# Patient Record
Sex: MALE | Race: WHITE | NOT HISPANIC OR LATINO | Employment: FULL TIME | URBAN - METROPOLITAN AREA
[De-identification: names, ages, dates, MRNs, and addresses within clinical notes are randomized per-mention and may not be internally consistent; named-entity substitution may affect disease eponyms.]

---

## 2017-05-22 ENCOUNTER — HOSPITAL ENCOUNTER (EMERGENCY)
Facility: HOSPITAL | Age: 27
Discharge: HOME | End: 2017-05-22
Attending: EMERGENCY MEDICINE
Payer: COMMERCIAL

## 2017-05-22 VITALS
BODY MASS INDEX: 25.06 KG/M2 | SYSTOLIC BLOOD PRESSURE: 157 MMHG | HEART RATE: 86 BPM | WEIGHT: 185 LBS | HEIGHT: 72 IN | RESPIRATION RATE: 20 BRPM | TEMPERATURE: 99 F | OXYGEN SATURATION: 97 % | DIASTOLIC BLOOD PRESSURE: 97 MMHG

## 2017-05-22 DIAGNOSIS — S61.219A LACERATION OF FINGER, LEFT, INITIAL ENCOUNTER: Primary | ICD-10-CM

## 2017-05-22 PROCEDURE — 12001 RPR S/N/AX/GEN/TRNK 2.5CM/<: CPT

## 2017-05-22 PROCEDURE — 99283 EMERGENCY DEPT VISIT LOW MDM: CPT | Mod: 25

## 2017-05-22 NOTE — ED PROVIDER NOTES
"Encounter Date: 5/22/2017       History     Chief Complaint   Patient presents with    Laceration     Pt states, "I cut my finger with a kitchen knife while cutting bread".     Review of patient's allergies indicates:  No Known Allergies  26 year old male with complaint of laceration to left ring finger X one hour.  Pt accidentally cut himself with kitchen knife while cooking.  Mild pain.  No numbness or tingling or weakness of finger.  Pt denies radiation of pain.  Reports tetanus shot up to date.            Past Medical History:   Diagnosis Date    ADHD (attention deficit hyperactivity disorder)     Anxiety     Bipolar disorder     Depression     Hep C w/o coma, chronic     Hepatitis C antibody test positive - with negative HCV RNA  4/12/2016    History of psychiatric hospitalization     [facility]? last Feb 2016 [facility] Xs 2 last Dec 2015    Liver disease     Hepatitis C    Psychiatric exam requested by authority     Psychiatric problem     Psychosis     Aud    Suicide attempt     attempted OD @ age 15 / 16    Therapy      Past Surgical History:   Procedure Laterality Date    arm surgery       Family History   Problem Relation Age of Onset    Bipolar disorder Mother      Social History   Substance Use Topics    Smoking status: Current Every Day Smoker     Packs/day: 1.00     Years: 9.00     Types: Cigarettes    Smokeless tobacco: Not on file    Alcohol use 0.0 oz/week      Comment: 1 pint a week     Review of Systems   Constitutional: Negative for fever.   HENT: Negative for sore throat.    Respiratory: Negative for shortness of breath.    Cardiovascular: Negative for chest pain.   Gastrointestinal: Negative for nausea.   Genitourinary: Negative for dysuria.   Musculoskeletal: Negative for back pain.   Skin: Negative for rash.        Laceration finger   Neurological: Negative for weakness.   Hematological: Does not bruise/bleed easily.       Physical Exam     Initial Vitals [05/22/17 " 0933]   BP Pulse Resp Temp SpO2   (!) 157/97 86 20 98.5 °F (36.9 °C) 97 %     Physical Exam    Nursing note and vitals reviewed.  Constitutional: He appears well-developed and well-nourished.   HENT:   Head: Normocephalic and atraumatic.   Eyes: Conjunctivae are normal. Pupils are equal, round, and reactive to light.   Neck: Normal range of motion. Neck supple.   Cardiovascular: Normal rate, regular rhythm, normal heart sounds and intact distal pulses.   Pulmonary/Chest: Breath sounds normal.   Abdominal: Soft. There is no rebound and no guarding.   Musculoskeletal: Normal range of motion.   Neurological: He is alert.   Skin: Skin is warm and dry.   1 cm superficial laceration distal volar aspect of left ring finger, no tendon lacerations, wound clean   Psychiatric: He has a normal mood and affect. His behavior is normal. Thought content normal.         ED Course   Lac Repair  Date/Time: 5/22/2017 9:51 AM  Performed by: ROBERTO BRANTLEY  Authorized by: CHAPITO COLE   Comments: Wound irrigated with NS, cleansed with betadine and approximated with tissue glue    Splint Application  Date/Time: 5/22/2017 9:51 AM  Performed by: ROBERTO BRANTLEY  Authorized by: CHAPITO COLE   Comments: Aluminum foam splint applied to left ring finger: alignment good, neurovascular status intact        Labs Reviewed - No data to display                            ED Course     Clinical Impression:   The encounter diagnosis was Laceration of finger, left, initial encounter.          Roberto Brantley NP  05/22/17 0952

## 2018-02-08 ENCOUNTER — OFFICE VISIT (OUTPATIENT)
Dept: INTERNAL MEDICINE | Facility: CLINIC | Age: 28
End: 2018-02-08
Payer: COMMERCIAL

## 2018-02-08 ENCOUNTER — LAB VISIT (OUTPATIENT)
Dept: LAB | Facility: HOSPITAL | Age: 28
End: 2018-02-08
Attending: INTERNAL MEDICINE
Payer: COMMERCIAL

## 2018-02-08 VITALS
WEIGHT: 193.56 LBS | OXYGEN SATURATION: 97 % | BODY MASS INDEX: 27.1 KG/M2 | SYSTOLIC BLOOD PRESSURE: 132 MMHG | DIASTOLIC BLOOD PRESSURE: 80 MMHG | HEART RATE: 72 BPM | HEIGHT: 71 IN

## 2018-02-08 DIAGNOSIS — Z00.00 PHYSICAL EXAM: Primary | ICD-10-CM

## 2018-02-08 DIAGNOSIS — Z00.00 PHYSICAL EXAM: ICD-10-CM

## 2018-02-08 DIAGNOSIS — F19.10 SUBSTANCE ABUSE: ICD-10-CM

## 2018-02-08 LAB
CHOLEST SERPL-MCNC: 166 MG/DL
CHOLEST/HDLC SERPL: 4 {RATIO}
HDLC SERPL-MCNC: 42 MG/DL
HDLC SERPL: 25.3 %
LDLC SERPL CALC-MCNC: 97.8 MG/DL
NONHDLC SERPL-MCNC: 124 MG/DL
TRIGL SERPL-MCNC: 131 MG/DL

## 2018-02-08 PROCEDURE — 3008F BODY MASS INDEX DOCD: CPT | Mod: S$GLB,,, | Performed by: INTERNAL MEDICINE

## 2018-02-08 PROCEDURE — 80074 ACUTE HEPATITIS PANEL: CPT

## 2018-02-08 PROCEDURE — 86580 TB INTRADERMAL TEST: CPT | Mod: S$GLB,,, | Performed by: INTERNAL MEDICINE

## 2018-02-08 PROCEDURE — 36415 COLL VENOUS BLD VENIPUNCTURE: CPT | Mod: PO

## 2018-02-08 PROCEDURE — 99395 PREV VISIT EST AGE 18-39: CPT | Mod: S$GLB,,, | Performed by: INTERNAL MEDICINE

## 2018-02-08 PROCEDURE — 86703 HIV-1/HIV-2 1 RESULT ANTBDY: CPT

## 2018-02-08 PROCEDURE — 80061 LIPID PANEL: CPT

## 2018-02-08 PROCEDURE — 99213 OFFICE O/P EST LOW 20 MIN: CPT | Mod: PO | Performed by: INTERNAL MEDICINE

## 2018-02-08 PROCEDURE — 87491 CHLMYD TRACH DNA AMP PROBE: CPT

## 2018-02-08 PROCEDURE — 99999 PR PBB SHADOW E&M-EST. PATIENT-LVL III: CPT | Mod: PBBFAC,,, | Performed by: INTERNAL MEDICINE

## 2018-02-08 PROCEDURE — 86592 SYPHILIS TEST NON-TREP QUAL: CPT

## 2018-02-08 NOTE — PROGRESS NOTES
Subjective:       Patient ID: Umberto Atkins is a 27 y.o. male.    Chief Complaint: Physical Exam, establish care    HPI Mr. Atkins is a 27-year-old male with ADHD, bipolar disorder, tobacco abuse, substance abuse and history of positive hepatitis C antibody who presents for a physical exam and to establish care.  He needs a documented physical exam, lab work, and PPD testing in order to enter a substance abuse program.  He presents today so that we can assist with this.  On review of systems he reports episodes of shortness of breath that have occurred when he feels anxious.  Otherwise he reported headache which occurred one week ago but has not occurred since that time.    Past medical history: ADHD, bipolar disorder, history of psychosis, history of suicide attempt, history of positive hepatitis C antibody  Past surgical history: Arm surgery, no surgery  Social history: Current tobacco use.  Also positive for current drug use.  Patient states that he uses cocaine and this is the reason he is trying to get himself into a treatment facility.  Also uses alcohol.  Family history: Mother with bipolar disorder  Medications: None  Allergies: None    Review of Systems   Constitutional: Negative for fever.   HENT: Negative for congestion.    Eyes: Negative for visual disturbance.   Respiratory: Positive for shortness of breath.    Cardiovascular: Negative for chest pain.   Gastrointestinal: Negative for vomiting.   Skin: Negative for rash.   Neurological: Positive for headaches (one week ago now resolved).       Objective:      Physical Exam   Constitutional: He is oriented to person, place, and time. He appears well-developed and well-nourished. No distress.   HENT:   Head: Normocephalic and atraumatic.   Right Ear: External ear normal.   Left Ear: External ear normal.   Nose: Nose normal.   Mouth/Throat: Oropharynx is clear and moist. No oropharyngeal exudate.   Eyes: Conjunctivae and EOM are normal. Pupils are equal,  round, and reactive to light. Right eye exhibits no discharge. Left eye exhibits no discharge. No scleral icterus.   Neck: Neck supple. No tracheal deviation present. No thyromegaly present.   Cardiovascular: Normal rate, regular rhythm, normal heart sounds and intact distal pulses.  Exam reveals no gallop and no friction rub.    No murmur heard.  Pulmonary/Chest: Effort normal and breath sounds normal. No respiratory distress. He has no wheezes. He has no rales.   Abdominal: Soft. Bowel sounds are normal. He exhibits no distension and no mass. There is no tenderness. There is no rebound and no guarding.   Musculoskeletal: He exhibits no edema or deformity.   Lymphadenopathy:     He has no cervical adenopathy.   Neurological: He is alert and oriented to person, place, and time.   Skin: Skin is warm and dry. He is not diaphoretic.   Psychiatric: Thought content normal.   Appeared anxious. Answered questions appropriately   Vitals reviewed.      Assessment:       1. Physical exam    2. Substance abuse        Plan:     #1 physical exam and required labs  Patient presents with a request for physical exam and required lab work in order to enter a substance abuse treatment facility.  Patient has a normal physical exam.  He is requesting lab work which includes lipid panel, TB skin test, STD screening, hepatitis panel, HIV, and RPR.  We are obtaining his labs today.  We are placing a TB skin test today.  He will return on Saturday for this to be read.  He is aware that he will need to provide us with the fax number for the substance abuse treatment facility in order to fax this note and the results of his testing.    RTC PRN

## 2018-02-09 LAB
C TRACH DNA SPEC QL NAA+PROBE: NOT DETECTED
HAV IGM SERPL QL IA: NEGATIVE
HBV CORE IGM SERPL QL IA: NEGATIVE
HBV SURFACE AG SERPL QL IA: NEGATIVE
HCV AB SERPL QL IA: POSITIVE
HIV 1+2 AB+HIV1 P24 AG SERPL QL IA: NEGATIVE
N GONORRHOEA DNA SPEC QL NAA+PROBE: NOT DETECTED
RPR SER QL: NORMAL

## 2018-02-09 RX ORDER — LITHIUM CARBONATE 300 MG/1
300 CAPSULE ORAL
COMMUNITY

## 2018-02-09 RX ORDER — VALACYCLOVIR HYDROCHLORIDE 1 G/1
1000 TABLET, FILM COATED ORAL
COMMUNITY

## 2018-02-12 ENCOUNTER — HOSPITAL ENCOUNTER (OUTPATIENT)
Dept: RADIOLOGY | Facility: HOSPITAL | Age: 28
Discharge: HOME | End: 2018-02-12
Attending: INTERNAL MEDICINE
Payer: COMMERCIAL

## 2018-02-12 ENCOUNTER — TELEPHONE (OUTPATIENT)
Dept: INTERNAL MEDICINE | Facility: CLINIC | Age: 28
End: 2018-02-12

## 2018-02-12 DIAGNOSIS — Z00.00 PHYSICAL EXAM: ICD-10-CM

## 2018-02-12 PROCEDURE — 71046 X-RAY EXAM CHEST 2 VIEWS: CPT | Mod: 26,,, | Performed by: RADIOLOGY

## 2018-02-12 PROCEDURE — 71046 X-RAY EXAM CHEST 2 VIEWS: CPT | Mod: TC,FY,PO

## 2018-02-12 NOTE — TELEPHONE ENCOUNTER
----- Message from Jami Camejo MD sent at 2/12/2018  1:25 PM CST -----  Please contact Mr. Atkins and let him know that he should have a hepatitis C viral load checked again. Ask him when he might like to do this.    Thanks
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
## 2018-02-14 ENCOUNTER — TELEPHONE (OUTPATIENT)
Dept: INTERNAL MEDICINE | Facility: CLINIC | Age: 28
End: 2018-02-14

## 2018-02-14 NOTE — TELEPHONE ENCOUNTER
Called pt, left message advising that pt chest xray results were on the portal and doctor jennyfer left a note saying that is was normal! I adv pt if he had any more questions to call [phone]!

## 2018-02-14 NOTE — TELEPHONE ENCOUNTER
----- Message from Radha Brantley sent at 2/12/2018  4:42 PM CST -----  Contact: Self [phone]  Patient is calling to get his test results on his X-ray. Please advice

## 2018-02-14 NOTE — TELEPHONE ENCOUNTER
----- Message from Jami Camejo MD sent at 2/14/2018 12:36 PM CST -----  Contact: Self [phone]  Mr. Atkins is listed as active on the patient portal. Therefore, I released his chest xray results on the patient portal with a note. It was normal.    Thanks  ----- Message -----  From: Venus Boyd MA  Sent: 2/14/2018  11:46 AM  To: Jami Camejo MD    Please be advised that pt would like test results on Chest Xray he just took!   ----- Message -----  From: Radha Brantley  Sent: 2/12/2018   4:42 PM  To: [facility] Staff    Patient is calling to get his test results on his X-ray. Please advice

## 2019-10-01 ENCOUNTER — OCCUPATIONAL HEALTH (OUTPATIENT)
Dept: URGENT CARE | Facility: CLINIC | Age: 29
End: 2019-10-01

## 2019-10-01 DIAGNOSIS — Z02.83 ENCOUNTER FOR DRUG SCREENING: Primary | ICD-10-CM

## 2019-10-01 LAB
CTP QC/QA: YES
POC 5 PANEL DRUG SCREEN: NEGATIVE

## 2019-10-01 PROCEDURE — 80305 POCT RAPID DRUG SCREEN 5 PANEL: ICD-10-PCS | Mod: QW,S$GLB,, | Performed by: PREVENTIVE MEDICINE

## 2019-10-01 PROCEDURE — 80305 DRUG TEST PRSMV DIR OPT OBS: CPT | Mod: QW,S$GLB,, | Performed by: PREVENTIVE MEDICINE
